# Patient Record
Sex: FEMALE | Race: WHITE | Employment: FULL TIME | URBAN - METROPOLITAN AREA
[De-identification: names, ages, dates, MRNs, and addresses within clinical notes are randomized per-mention and may not be internally consistent; named-entity substitution may affect disease eponyms.]

---

## 2018-01-05 ENCOUNTER — HOSPITAL ENCOUNTER (OUTPATIENT)
Dept: CT IMAGING | Age: 53
Discharge: HOME | End: 2018-01-05
Attending: COLON & RECTAL SURGERY
Payer: COMMERCIAL

## 2018-01-05 DIAGNOSIS — K50.90 CROHN'S DISEASE (HCC): ICD-10-CM

## 2018-01-05 LAB — CREAT BLD-MCNC: 0.9 MG/DL (ref 0.6–1.3)

## 2018-01-05 PROCEDURE — 82565 ASSAY OF CREATININE: CPT

## 2018-01-05 PROCEDURE — 74011636320 HC RX REV CODE- 636/320

## 2018-01-05 PROCEDURE — 74177 CT ABD & PELVIS W/CONTRAST: CPT

## 2018-01-05 RX ADMIN — IOPAMIDOL: 755 INJECTION, SOLUTION INTRAVENOUS at 17:00

## 2018-01-08 ENCOUNTER — ANESTHESIA EVENT (OUTPATIENT)
Dept: SURGERY | Age: 53
End: 2018-01-08
Payer: COMMERCIAL

## 2018-01-08 RX ORDER — LOSARTAN POTASSIUM 100 MG/1
100 TABLET ORAL DAILY
COMMUNITY

## 2018-01-08 RX ORDER — BISMUTH SUBSALICYLATE 262 MG
1 TABLET,CHEWABLE ORAL DAILY
COMMUNITY
End: 2018-02-12

## 2018-01-08 RX ORDER — OMEPRAZOLE 20 MG/1
20 CAPSULE, DELAYED RELEASE ORAL DAILY
COMMUNITY
End: 2018-02-12

## 2018-01-08 RX ORDER — LANOLIN ALCOHOL/MO/W.PET/CERES
CREAM (GRAM) TOPICAL
COMMUNITY
End: 2018-02-12

## 2018-01-08 RX ORDER — CETIRIZINE HCL 10 MG
10 TABLET ORAL DAILY
COMMUNITY
End: 2018-02-12

## 2018-01-08 RX ORDER — LANOLIN ALCOHOL/MO/W.PET/CERES
1 CREAM (GRAM) TOPICAL DAILY
COMMUNITY
End: 2018-02-12

## 2018-01-08 RX ORDER — CYCLOBENZAPRINE HCL 10 MG
10 TABLET ORAL 2 TIMES DAILY
COMMUNITY
End: 2018-02-12

## 2018-01-08 RX ORDER — MONTELUKAST SODIUM 10 MG/1
10 TABLET ORAL DAILY
COMMUNITY

## 2018-01-08 RX ORDER — MULTIVIT WITH MINERALS/HERBS
1 TABLET ORAL DAILY
COMMUNITY
End: 2018-02-12

## 2018-01-08 NOTE — PERIOP NOTES
[facility]  Ambulatory Surgery Unit  Pre-operative Instructions    Surgery/Procedure Date  1/9/18            Tentative Arrival Time 1015      1. On the day of your surgery/procedure, please report to the Ambulatory Surgery Unit Registration Desk and sign in at your designated time. The Ambulatory Surgery Unit is located in [facility] on the [facility] side of the [facility] across from the [facility] building. Please have all of your health insurance cards and a photo ID. 2. You must have someone with you to drive you home, as you should not drive a car for 24 hours following anesthesia. Please make arrangements for a responsible adult friend or family member to stay with you for at least the first 24 hours after your surgery. 3. Do not have anything to eat or drink (including water, gum, mints, coffee, juice) after midnight   1/8/18. This may not apply to medications prescribed by your physician. (Please note below the special instructions with medications to take the morning of surgery, if applicable.)    4. We recommend you do not drink any alcoholic beverages for 24 hours before and after your surgery. 5. Contact your surgeons office for instructions on the following medications: non-steroidal anti-inflammatory drugs (i.e. Advil, Aleve), vitamins, and supplements. (Some surgeons will want you to stop these medications prior to surgery and others may allow you to take them)   **If you are currently taking Plavix, Coumadin, Aspirin and/or other blood-thinning agents, contact your surgeon for instructions. ** Your surgeon will partner with the physician prescribing these medications to determine if it is safe to stop or if you need to continue taking. Please do not stop taking these medications without instructions from your surgeon.     6. In an effort to help prevent surgical site infection, we ask that you shower with an anti-bacterial soap (i.e. Dial or Safeguard) for 3 days prior to and on the morning of surgery, using a fresh towel after each shower. (Please begin this process with fresh bed linens.) Do not apply any lotions, powders, or deodorants after the shower on the day of your procedure. If applicable, please do not shave the operative site for 48 hours prior to surgery. 7. Wear comfortable clothes. Wear glasses instead of contacts. Do not bring any jewelry or money (other than copays or fees as instructed). Do not wear make-up, particularly mascara, the morning of your surgery. Do not wear nail polish, particularly if you are having foot /hand surgery. Wear your hair loose or down, no ponytails, buns, berna pins or clips. All body piercings must be removed. 8. You should understand that if you do not follow these instructions your surgery may be cancelled. If your physical condition changes (i.e. fever, cold or flu) please contact your surgeon as soon as possible. 9. It is important that you be on time. If a situation occurs where you may be late, or if you have any questions or problems, please call [phone].    10. Your surgery time may be subject to change. You will receive a phone call the day prior to surgery to confirm your arrival time. 11. Pediatric patients: please bring a change of clothes, diapers, bottle/sippy cup, pacifier, etc.      Special Instructions: Take all medications and inhalers, as prescribed, on the morning of surgery with a sip of water EXCEPT: none      I understand a pre-operative phone call will be made to verify my surgery time. In the event that I am not available, I give permission for a message to be left on my answering service and/or with another person?       Yes     (instructions given verbally during phone assessment- pt voiced understanding)     ___________________      ___________________      ________________  (Signature of Patient)          (Witness)                   (Date and Time)

## 2018-01-09 ENCOUNTER — ANESTHESIA (OUTPATIENT)
Dept: SURGERY | Age: 53
End: 2018-01-09
Payer: COMMERCIAL

## 2018-01-09 ENCOUNTER — HOSPITAL ENCOUNTER (OUTPATIENT)
Age: 53
Setting detail: OUTPATIENT SURGERY
Discharge: HOME | End: 2018-01-09
Attending: COLON & RECTAL SURGERY | Admitting: COLON & RECTAL SURGERY
Payer: COMMERCIAL

## 2018-01-09 ENCOUNTER — APPOINTMENT (OUTPATIENT)
Dept: CT IMAGING | Age: 53
End: 2018-01-09
Attending: COLON & RECTAL SURGERY
Payer: COMMERCIAL

## 2018-01-09 VITALS
BODY MASS INDEX: 33.51 KG/M2 | HEIGHT: 69 IN | WEIGHT: 226.25 LBS | SYSTOLIC BLOOD PRESSURE: 147 MMHG | TEMPERATURE: 97.9 F | HEART RATE: 78 BPM | RESPIRATION RATE: 16 BRPM | DIASTOLIC BLOOD PRESSURE: 81 MMHG | OXYGEN SATURATION: 95 %

## 2018-01-09 PROBLEM — K62.4 ANAL STRICTURE: Status: ACTIVE | Noted: 2018-01-09

## 2018-01-09 PROCEDURE — 74011000250 HC RX REV CODE- 250

## 2018-01-09 PROCEDURE — 76210000040 HC AMBSU PH I REC FIRST 0.5 HR: Performed by: COLON & RECTAL SURGERY

## 2018-01-09 PROCEDURE — 88305 TISSUE EXAM BY PATHOLOGIST: CPT | Performed by: COLON & RECTAL SURGERY

## 2018-01-09 PROCEDURE — 74011000250 HC RX REV CODE- 250: Performed by: COLON & RECTAL SURGERY

## 2018-01-09 PROCEDURE — 72192 CT PELVIS W/O DYE: CPT

## 2018-01-09 PROCEDURE — 77030008684 HC TU ET CUF COVD -B: Performed by: NURSE ANESTHETIST, CERTIFIED REGISTERED

## 2018-01-09 PROCEDURE — 74011250636 HC RX REV CODE- 250/636: Performed by: ANESTHESIOLOGY

## 2018-01-09 PROCEDURE — 74011250636 HC RX REV CODE- 250/636

## 2018-01-09 PROCEDURE — 77030018836 HC SOL IRR NACL ICUM -A: Performed by: COLON & RECTAL SURGERY

## 2018-01-09 PROCEDURE — 77030021352 HC CBL LD SYS DISP COVD -B: Performed by: COLON & RECTAL SURGERY

## 2018-01-09 PROCEDURE — 77030026438 HC STYL ET INTUB CARD -A: Performed by: NURSE ANESTHETIST, CERTIFIED REGISTERED

## 2018-01-09 PROCEDURE — 77030020255 HC SOL INJ LR 1000ML BG: Performed by: COLON & RECTAL SURGERY

## 2018-01-09 PROCEDURE — 76210000050 HC AMBSU PH II REC 0.5 TO 1 HR: Performed by: COLON & RECTAL SURGERY

## 2018-01-09 PROCEDURE — 77030019988 HC FCPS ENDOSC DISP BSC -B: Performed by: COLON & RECTAL SURGERY

## 2018-01-09 PROCEDURE — 76060000062 HC AMB SURG ANES 1 TO 1.5 HR: Performed by: COLON & RECTAL SURGERY

## 2018-01-09 PROCEDURE — 76030000001 HC AMB SURG OR TIME 1 TO 1.5: Performed by: COLON & RECTAL SURGERY

## 2018-01-09 RX ORDER — SODIUM CHLORIDE, SODIUM LACTATE, POTASSIUM CHLORIDE, CALCIUM CHLORIDE 600; 310; 30; 20 MG/100ML; MG/100ML; MG/100ML; MG/100ML
25 INJECTION, SOLUTION INTRAVENOUS CONTINUOUS
Status: DISCONTINUED | OUTPATIENT
Start: 2018-01-09 | End: 2018-01-09 | Stop reason: HOSPADM

## 2018-01-09 RX ORDER — LIDOCAINE HYDROCHLORIDE 10 MG/ML
0.1 INJECTION, SOLUTION EPIDURAL; INFILTRATION; INTRACAUDAL; PERINEURAL AS NEEDED
Status: DISCONTINUED | OUTPATIENT
Start: 2018-01-09 | End: 2018-01-09 | Stop reason: HOSPADM

## 2018-01-09 RX ORDER — OXYCODONE AND ACETAMINOPHEN 5; 325 MG/1; MG/1
1 TABLET ORAL ONCE
Status: DISCONTINUED | OUTPATIENT
Start: 2018-01-09 | End: 2018-01-09 | Stop reason: HOSPADM

## 2018-01-09 RX ORDER — ROCURONIUM BROMIDE 10 MG/ML
INJECTION, SOLUTION INTRAVENOUS AS NEEDED
Status: DISCONTINUED | OUTPATIENT
Start: 2018-01-09 | End: 2018-01-09 | Stop reason: HOSPADM

## 2018-01-09 RX ORDER — HYDROMORPHONE HYDROCHLORIDE 1 MG/ML
.2-.5 INJECTION, SOLUTION INTRAMUSCULAR; INTRAVENOUS; SUBCUTANEOUS ONCE
Status: DISCONTINUED | OUTPATIENT
Start: 2018-01-09 | End: 2018-01-09 | Stop reason: HOSPADM

## 2018-01-09 RX ORDER — NEOSTIGMINE METHYLSULFATE 1 MG/ML
INJECTION INTRAVENOUS AS NEEDED
Status: DISCONTINUED | OUTPATIENT
Start: 2018-01-09 | End: 2018-01-09 | Stop reason: HOSPADM

## 2018-01-09 RX ORDER — HYDROCODONE BITARTRATE AND ACETAMINOPHEN 5; 325 MG/1; MG/1
1 TABLET ORAL
COMMUNITY
End: 2018-02-12

## 2018-01-09 RX ORDER — BUPIVACAINE HYDROCHLORIDE 2.5 MG/ML
INJECTION, SOLUTION EPIDURAL; INFILTRATION; INTRACAUDAL AS NEEDED
Status: DISCONTINUED | OUTPATIENT
Start: 2018-01-09 | End: 2018-01-09 | Stop reason: HOSPADM

## 2018-01-09 RX ORDER — ONDANSETRON 2 MG/ML
INJECTION INTRAMUSCULAR; INTRAVENOUS AS NEEDED
Status: DISCONTINUED | OUTPATIENT
Start: 2018-01-09 | End: 2018-01-09 | Stop reason: HOSPADM

## 2018-01-09 RX ORDER — FENTANYL CITRATE 50 UG/ML
INJECTION, SOLUTION INTRAMUSCULAR; INTRAVENOUS AS NEEDED
Status: DISCONTINUED | OUTPATIENT
Start: 2018-01-09 | End: 2018-01-09 | Stop reason: HOSPADM

## 2018-01-09 RX ORDER — SODIUM CHLORIDE 0.9 % (FLUSH) 0.9 %
5-10 SYRINGE (ML) INJECTION EVERY 8 HOURS
Status: DISCONTINUED | OUTPATIENT
Start: 2018-01-09 | End: 2018-01-09 | Stop reason: HOSPADM

## 2018-01-09 RX ORDER — SUCCINYLCHOLINE CHLORIDE 20 MG/ML
INJECTION INTRAMUSCULAR; INTRAVENOUS AS NEEDED
Status: DISCONTINUED | OUTPATIENT
Start: 2018-01-09 | End: 2018-01-09 | Stop reason: HOSPADM

## 2018-01-09 RX ORDER — DEXAMETHASONE SODIUM PHOSPHATE 4 MG/ML
INJECTION, SOLUTION INTRA-ARTICULAR; INTRALESIONAL; INTRAMUSCULAR; INTRAVENOUS; SOFT TISSUE AS NEEDED
Status: DISCONTINUED | OUTPATIENT
Start: 2018-01-09 | End: 2018-01-09 | Stop reason: HOSPADM

## 2018-01-09 RX ORDER — PROPOFOL 10 MG/ML
INJECTION, EMULSION INTRAVENOUS AS NEEDED
Status: DISCONTINUED | OUTPATIENT
Start: 2018-01-09 | End: 2018-01-09 | Stop reason: HOSPADM

## 2018-01-09 RX ORDER — SODIUM CHLORIDE 0.9 % (FLUSH) 0.9 %
5-10 SYRINGE (ML) INJECTION AS NEEDED
Status: DISCONTINUED | OUTPATIENT
Start: 2018-01-09 | End: 2018-01-09 | Stop reason: HOSPADM

## 2018-01-09 RX ORDER — GLYCOPYRROLATE 0.2 MG/ML
INJECTION INTRAMUSCULAR; INTRAVENOUS AS NEEDED
Status: DISCONTINUED | OUTPATIENT
Start: 2018-01-09 | End: 2018-01-09 | Stop reason: HOSPADM

## 2018-01-09 RX ORDER — LIDOCAINE HYDROCHLORIDE 20 MG/ML
INJECTION, SOLUTION EPIDURAL; INFILTRATION; INTRACAUDAL; PERINEURAL AS NEEDED
Status: DISCONTINUED | OUTPATIENT
Start: 2018-01-09 | End: 2018-01-09 | Stop reason: HOSPADM

## 2018-01-09 RX ORDER — DIPHENHYDRAMINE HYDROCHLORIDE 50 MG/ML
12.5 INJECTION, SOLUTION INTRAMUSCULAR; INTRAVENOUS AS NEEDED
Status: DISCONTINUED | OUTPATIENT
Start: 2018-01-09 | End: 2018-01-09 | Stop reason: HOSPADM

## 2018-01-09 RX ORDER — MIDAZOLAM HYDROCHLORIDE 1 MG/ML
INJECTION, SOLUTION INTRAMUSCULAR; INTRAVENOUS AS NEEDED
Status: DISCONTINUED | OUTPATIENT
Start: 2018-01-09 | End: 2018-01-09 | Stop reason: HOSPADM

## 2018-01-09 RX ORDER — FENTANYL CITRATE 50 UG/ML
25 INJECTION, SOLUTION INTRAMUSCULAR; INTRAVENOUS
Status: DISCONTINUED | OUTPATIENT
Start: 2018-01-09 | End: 2018-01-09 | Stop reason: HOSPADM

## 2018-01-09 RX ORDER — MORPHINE SULFATE 10 MG/ML
2 INJECTION, SOLUTION INTRAMUSCULAR; INTRAVENOUS
Status: DISCONTINUED | OUTPATIENT
Start: 2018-01-09 | End: 2018-01-09 | Stop reason: HOSPADM

## 2018-01-09 RX ADMIN — DEXAMETHASONE SODIUM PHOSPHATE 4 MG: 4 INJECTION, SOLUTION INTRA-ARTICULAR; INTRALESIONAL; INTRAMUSCULAR; INTRAVENOUS; SOFT TISSUE at 12:06

## 2018-01-09 RX ADMIN — LIDOCAINE HYDROCHLORIDE 80 MG: 20 INJECTION, SOLUTION EPIDURAL; INFILTRATION; INTRACAUDAL; PERINEURAL at 11:52

## 2018-01-09 RX ADMIN — PROPOFOL 60 MG: 10 INJECTION, EMULSION INTRAVENOUS at 12:04

## 2018-01-09 RX ADMIN — ROCURONIUM BROMIDE 15 MG: 10 INJECTION, SOLUTION INTRAVENOUS at 12:06

## 2018-01-09 RX ADMIN — FENTANYL CITRATE 50 MCG: 50 INJECTION, SOLUTION INTRAMUSCULAR; INTRAVENOUS at 11:52

## 2018-01-09 RX ADMIN — FENTANYL CITRATE 50 MCG: 50 INJECTION, SOLUTION INTRAMUSCULAR; INTRAVENOUS at 11:44

## 2018-01-09 RX ADMIN — MIDAZOLAM HYDROCHLORIDE 2 MG: 1 INJECTION, SOLUTION INTRAMUSCULAR; INTRAVENOUS at 11:44

## 2018-01-09 RX ADMIN — PROPOFOL 40 MG: 10 INJECTION, EMULSION INTRAVENOUS at 12:30

## 2018-01-09 RX ADMIN — ONDANSETRON 4 MG: 2 INJECTION INTRAMUSCULAR; INTRAVENOUS at 12:07

## 2018-01-09 RX ADMIN — NEOSTIGMINE METHYLSULFATE 3 MG: 1 INJECTION INTRAVENOUS at 12:36

## 2018-01-09 RX ADMIN — PROPOFOL 30 MG: 10 INJECTION, EMULSION INTRAVENOUS at 12:34

## 2018-01-09 RX ADMIN — GLYCOPYRROLATE 0.2 MG: 0.2 INJECTION INTRAMUSCULAR; INTRAVENOUS at 12:36

## 2018-01-09 RX ADMIN — SODIUM CHLORIDE, SODIUM LACTATE, POTASSIUM CHLORIDE, AND CALCIUM CHLORIDE 25 ML/HR: 600; 310; 30; 20 INJECTION, SOLUTION INTRAVENOUS at 11:17

## 2018-01-09 RX ADMIN — SUCCINYLCHOLINE CHLORIDE 180 MG: 20 INJECTION INTRAMUSCULAR; INTRAVENOUS at 11:52

## 2018-01-09 RX ADMIN — PROPOFOL 30 MG: 10 INJECTION, EMULSION INTRAVENOUS at 12:37

## 2018-01-09 RX ADMIN — PROPOFOL 170 MG: 10 INJECTION, EMULSION INTRAVENOUS at 11:52

## 2018-01-09 NOTE — INTERVAL H&P NOTE
H&P Update:  Shyla Arroyo was seen and examined. History and physical has been reviewed. The patient has been examined.  There have been no significant clinical changes since the completion of the originally dated History and Physical.    Signed By: David Ascencio MD     January 9, 2018 11:42 AM

## 2018-01-09 NOTE — IP AVS SNAPSHOT
[address] [facility] 83. 
[phone] Patient: Aditya Kearns MRN: [id number] :1965 About your hospitalization You were admitted on:  2018 You last received care in the:  [facility] SURGERY UNIT You were discharged on:  2018 Why you were hospitalized Your primary diagnosis was: Anal Stricture Follow-up Information Follow up With Details Comments Contact Info Wanda Michelle MD   [address] [place] [address] Drive [address] [place] [address] [phone] Discharge Orders None A check shraddha indicates which time of day the medication should be taken. My Medications CONTINUE taking these medications Instructions Each Dose to Equal  
 Morning Noon Evening Bedtime  
 b complex vitamins tablet Your last dose was: Your next dose is: Take 1 Tab by mouth daily. 1 Tab  
    
   
   
   
  
 cyclobenzaprine 10 mg tablet Commonly known as:  FLEXERIL Your last dose was: Your next dose is: Take 10 mg by mouth two (2) times a day. 10 mg  
    
   
   
   
  
 glucosamine-chondroitin 500-400 mg Cap Commonly known as:  [address] [place] [address] Your last dose was: Your next dose is: Take 1 Cap by mouth daily. 1 Cap HYDROcodone-acetaminophen 5-325 mg per tablet Commonly known as:  Doximity Your last dose was: Your next dose is: Take 1 Tab by mouth every six (6) hours as needed for Pain. 1 Tab Iron 325 mg (65 mg iron) tablet Generic drug:  ferrous sulfate Your last dose was: Your next dose is: Take  by mouth Daily (before breakfast). losartan 100 mg tablet Commonly known as:  COZAAR Your last dose was: Your next dose is: Take 100 mg by mouth daily.   
 100 mg  
 montelukast 10 mg tablet Commonly known as:  SINGULAIR Your last dose was: Your next dose is: Take 10 mg by mouth daily. 10 mg  
    
   
   
   
  
 multivitamin tablet Commonly known as:  ONE A DAY Your last dose was: Your next dose is: Take 1 Tab by mouth daily. 1 Tab PriLOSEC 20 mg capsule Generic drug:  omeprazole Your last dose was: Your next dose is: Take 20 mg by mouth daily. 20 mg PROBIOTIC PO Your last dose was: Your next dose is: Take  by mouth daily. ZyrTEC 10 mg tablet Generic drug:  cetirizine Your last dose was: Your next dose is: Take 10 mg by mouth daily. 10 mg Discharge Instructions Martín Rivera MD, FACS Dorian BATRES. Charlee Gil MD, FACS Dom BATRES. Derick Saint, MD, FACS Jluis Amaro. Joby Cain MD, FACS Darleen Sheht MD, FACS Drew Robbins. MD Gil Pacheco MD 
 
[facility] Discharge Instructions for Ambulatory 23-Hour Surgery Patients 1. Advance to regular diet as tolerated. 2. Remove dressing at anytime. 3. Take 2 to 4 sitz baths today (warm water baths for 15-20 minutes). Begin four (4) times a day the day after surgery. 4. Apply Nupercainal or recticare Ointment (does not need a prescription) to the anal area after sitz baths, place a dry 4x4 gauze over the are between the buttocks. 5. Take pain medication as prescribed. (NO DRIVING WHILE ON PAIN MEDICATION). 6. No lifting any objects weighing more than 30 pounds. 7. No sitting more than 45 minutes without standing, walking, or lying down. 8. You may walk as desired. 9.  Please schedule an appointment in the office within 7 to 10 days. Call ahead to schedule your appointment [phone]. 10.  Call Exchange [phone] if you have any problems or questions after   hours. 11.  Expect slight bright red blood up to four (4) weeks from surgery. 12.  Will recheck a pelvic CT today. DO NOT TAKE TYLENOL/ACETAMINOPHEN WITH PERCOCET, LORTAB, [address] TAKE NARCOTIC PAIN MEDICATIONS WITH FOOD If given 2 pain narcotics do NOT take together! Narcotics tend to be constipating, we suggest taking a stool softener such as Colace or Miralax (follow package instructions). DO NOT DRIVE WHILE TAKING NARCOTIC PAIN MEDICATIONS. DO NOT TAKE SLEEPING MEDICATIONS OR ANTIANXIETY MEDICATIONS WHILE TAKING NARCOTIC PAIN MEDICATIONS,  ESPECIALLY THE NIGHT OF ANESTHESIA. CPAP PATIENTS BE SURE TO WEAR MACHINE WHENEVER NAPPING OR SLEEPING. DISCHARGE SUMMARY from Nurse The following personal items collected during your admission are returned to you:  
Dental Appliance: Dental Appliances: Lowers, Uppers Vision: Visual Aid: Glasses (glasses to recovery along with teeth) Hearing Aid:   
Jewelry:   
Clothing: Clothing:  (clothing under stretcher, phone to brother, glasses and dentures to recovery) Other Valuables:   
Valuables sent to safe:   
 
 
PATIENT INSTRUCTIONS: 
 
 
B - Balance E - Eyes F-  Face looks uneven A-  Arms unable to move or move even S-  Speech slurred or non-existent T-  Time-call 911 as soon as signs and symptoms begin-DO NOT go Back to bed or wait to see if you get better-TIME IS BRAIN. If you have not received your influenza and/or pneumococcal vaccine, please follow up with your primary care physician. The discharge information has been reviewed with the patient and caregiver. The patient and caregiver verbalized understanding. Introducing [facility]! Desiree Grady introduces [employer] patient portal. Now you can access parts of your medical record, email your doctor's office, and request medication refills online. 1. In your internet browser, go to [email or web address] 2. Click on the First Time User? Click Here link in the Sign In box. You will see the New Member Sign Up page. 3. Enter your [employer] Access Code exactly as it appears below. You will not need to use this code after youve completed the sign-up process. If you do not sign up before the expiration date, you must request a new code. · [employer] Access Code: [email or web address] Expires: 4/5/2018  1:49 PM 
 
 4. Enter the last four digits of your Social Security Number (xxxx) and Date of Birth (mm/dd/yyyy) as indicated and click Submit. You will be taken to the next sign-up page. 5. Create a [employer] ID. This will be your [employer] login ID and cannot be changed, so think of one that is secure and easy to remember. 6. Create a [employer] password. You can change your password at any time. 7. Enter your Password Reset Question and Answer. This can be used at a later time if you forget your password. 8. Enter your e-mail address. You will receive e-mail notification when new information is available in [address] 9. Click Sign Up. You can now view and download portions of your medical record. 10. Click the Download Summary menu link to download a portable copy of your medical information. If you have questions, please visit the Frequently Asked Questions section of the [employer] website. Remember, [employer] is NOT to be used for urgent needs. For medical emergencies, dial 911. Now available from your iPhone and Android! Providers Seen During Your Hospitalization Provider Specialty Primary office phone Anant Daniel MD [facility] [phone] Your Primary Care Physician (PCP) Primary Care Physician Office Phone Office Fax [facility] [address] [phone] You are allergic to the following No active allergies Recent Documentation Height Weight BMI OB Status Smoking Status 1.753 m 102.6 kg 33.41 kg/m2 Hysterectomy Current Every Day Smoker Emergency Contacts Name Discharge Info Relation Home Work Mobile [facility] DISCHARGE CAREGIVER [3] Other Relative [6] [phone] Patient Belongings  The following personal items are in your possession at time of discharge: 
  Dental Appliances: Lowers, Uppers  Visual Aid: Glasses (glasses to recovery along with teeth)             Clothing:  (clothing under stretcher, phone to brother, glasses and dentures to recovery) Please provide this summary of care documentation to your next provider. Signatures-by signing, you are acknowledging that this After Visit Summary has been reviewed with you and you have received a copy. Patient Signature:  ____________________________________________________________ Date:  ____________________________________________________________  
  
Suzanne Mealing Provider Signature:  ____________________________________________________________ Date:  ____________________________________________________________

## 2018-01-09 NOTE — OP NOTES
[facility] OP NOTE    Sharad French  MR#: [id number]  : 1965  ACCOUNT #: [de-identified]   DATE OF SERVICE: 2018    SURGEON:  Carlos Alberto [place]. Sharla Ambriz MD.     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 PROCEDURE:  Exam under anesthesia, dilation of anal canal from a #4 to a #20 Hegar dilator with decompression of the cystic cavity above the anal outlet, likely rectum, with flexible endoscopy and biopsies of likely a Nikki stump now decompressed above a now patent anal outlet. ANESTHESIA:  General.    PREOPERATIVE MEDICATIONS:  None. ESTIMATED BLOOD LOSS:  Less than 5 mL. SPECIMENS REMOVED:  Rectal biopsies    COMPLICATIONS:  none    INDICATIONS:  The patient is a 52-year-old woman who has had a total abdominal colectomy for Crohn's. She was told that she had a rectal stump left in place, but subsequently was told that only her anal canal was in place. She recently presented to the office and was found to have pelvic pain. In view of her history of possibly having a rectum left in place. We went ahead and got a CAT scan which shows what does appear to be a dilated fluid filled Nikki stump above an effectively completely obstructed Crohn's involved anal canal.  She is brought to the operating room today for exam under anesthesia and dilatation of the anal canal to allow decompression of his massively distended rectum.   It is possible that this may be a mucocele above the anal canal, but in any event, the fluid is drainable through the anal canal and if it is indeed her rectum, it is effectively completely obstructed by a stenosed closed Crohn's involved anal canal.  She is aware of the risks of the surgery including anesthesia, infection, bleeding, and is agreeable to proceed. PROCEDURE IN DETAIL:  After uneventful induction of general anesthetic, the patient was placed in a supine lithotomy position and sterilely prepped and draped. A digital exam revealed that indeed the anal canal was closed at the superior aspect of it. Using a #4 dilator, I carefully maneuvered through the top of the anal canal and entered a cystic cavity with return of copious amount of clear liquid fluid. The opening was dilated gradually from a #4 all the way up to a #20 Hegar dilator and digital exam revealed that the cavity followed the exact location of the rectum should in fact be. Indeed also with the finger in her vagina posteriorly before dilatation, we could feel the posterior wall being indented by the structure and when I was done dilating and decompressing all this fluid, that indentation was gone and the vagina had filled back out to its normal location and I could, with one finger inside the cystic cavity, feel thick tissue comprising what I suspect is in fact the rectum anterior to my finger and the posterior vaginal wall all completely intact. With this all done and this decompressed. I then inserted an upper endoscope and I used a smaller size scope for safety. We advanced the scope to the end of what appears to be Nikki stump. The mucosa is atypical and looks to be compatible with burned out Crohn's disease and an old Nikki stump. Photographs were obtained and then multiple biopsies were obtained. The cavity was completely decompressed and the scope was removed. She tolerated the procedure well, remained stable and left with a stable set of vitals. We will recheck a CAT scan postoperatively to be sure that this cavity has been nicely decompressed and we will await with interest the results of the biopsies.       MD BETSEY Brewer / Arnoldo.Darya  D: 01/09/2018 13:11     T: 01/09/2018 13:37  JOB #: [id number]  CC: Elizabeth Fermin

## 2018-01-09 NOTE — PERIOP NOTES
Ilan Sewell [facility]  1965  [id number]    Situation:  Verbal report given from: KAY Carr RN and KYLEE Jacob CRNA  Procedure: Procedure(s):   EXAM UNDER ANESTHESIA, DILATION OF HIGH GRADE COMPLETE ANAL CROHN'S  STRICTURE AND FLEXIBLE SIGMOIDOSCOPY WITH BIOPSIES OF RECTUM    Background:    Preoperative diagnosis: ANAL CANAL STRICTURE    Postoperative diagnosis: High grade complete anal stricture with obstruction of Nikki's rectal stump    :  Dr. Alcantar Ornava    Assistant(s): Circ-1: Jeffry Cabello RN  Scrub Tech-1: Jani Armor  Surg Asst-1: Leanora Holiday    Specimens:   ID Type Source Tests Collected by Time Destination   1 : Possible Nikki's stump (rectal stump) biopsy Preservative Rectal  Donna Cramer MD 1/9/2018 1225 Pathology       Assessment:  Intra-procedure medications         Anesthesia gave intra-procedure sedation and medications, see anesthesia flow sheet     Intravenous fluids: LR@ KVO     Vital signs stable       Recommendation:    Permission to share finding with family or friend yes

## 2018-01-09 NOTE — BRIEF OP NOTE
BRIEF OPERATIVE NOTE    Date of Procedure: 1/9/2018   Preoperative Diagnosis: ANAL CANAL STRICTURE  Postoperative Diagnosis: High grade complete anal stricture with obstruction of Nikki's rectal stump    Procedure(s):   EXAM UNDER ANESTHESIA, DILATION OF HIGH GRADE COMPLETE ANAL CROHN'S  STRICTURE AND FLEXIBLE SIGMOIDOSCOPY WITH BIOPSIES OF RECTUM  Surgeon(s) and Role:     * Mary Ramirez MD - Primary         Assistant Staff:       Surgical Staff:  Circ-1: Bashir Skinner RN  Scrub Tech-1: Monsalve Jurist  Surg Asst-1: Faustino Cao  Event Time In   Incision Start 1205   Incision Close 1232     Anesthesia: General   Estimated Blood Loss: 5 cc's  Specimens:   ID Type Source Tests Collected by Time Destination   1 : Possible Nikki's stump (rectal stump) biopsy Preservative Rectal  Mary Ramirez MD 1/9/2018 1225 Pathology      Findings: dilated cystic cavity above the anus;  Scoped and biopsied after dilation to #20 hegar, starting with a # 4.    Complications: none  Implants: * No implants in log *

## 2018-01-09 NOTE — PERIOP NOTES
1335 To radiology for CT scan of pelvis 1350 Ct scan pelvis completed. 1400 Transported back to [facility] via stretcher per 2 RNs. Pt awake/alert VSS. No complaints. D/C instructions reviewed with brother Alexey Josue and with boyfriend Josie Delgado (by phone -at pt's request)  1562 Discharged to home via/wc,accompanied to car per RN. Skin warm and dry, awake and alert. Respirations even, unlabored. Pt and family members questions and concerns addressed prior to discharge. All belongings with pt.

## 2018-01-09 NOTE — DISCHARGE INSTRUCTIONS
Akil Jewell MD, FACS  Dorian BATRES. Brenden Montes De Oca MD, FACS  Nicole Perdue. Jarad Dailey MD, [address] [facility] Damaris Shah MD, [address] [facility] Glen Maxwell MD, FACS  Kenneth Osborn. MD Cornelius Lawrence MD    [facility] Discharge Instructions for Ambulatory 23-Hour Surgery Patients    1. Advance to regular diet as tolerated. 2. Remove dressing at anytime. 3. Take 2 to 4 sitz baths today (warm water baths for 15-20 minutes). Begin four (4) times a day the day after surgery. 4. Apply Nupercainal or recticare Ointment (does not need a prescription) to the anal area after sitz baths, place a dry 4x4 gauze over the are between the buttocks. 5. Take pain medication as prescribed. (NO DRIVING WHILE ON PAIN MEDICATION). 6. No lifting any objects weighing more than 30 pounds. 7. No sitting more than 45 minutes without standing, walking, or lying down. 8. You may walk as desired. 9.  Please schedule an appointment in the office within 7 to 10 days. Call ahead to schedule your appointment [phone]. 10.  Call Exchange [phone] if you have any problems or questions after   hours. 11.  Expect slight bright red blood up to four (4) weeks from surgery. 12.  Will recheck a pelvic CT today. DO NOT TAKE TYLENOL/ACETAMINOPHEN WITH PERCOCET, [facility], [address] TAKE NARCOTIC PAIN MEDICATIONS WITH FOOD     If given 2 pain narcotics do NOT take together! Narcotics tend to be constipating, we suggest taking a stool softener such as Colace or Miralax (follow package instructions). DO NOT DRIVE WHILE TAKING NARCOTIC PAIN MEDICATIONS. DO NOT TAKE SLEEPING MEDICATIONS OR ANTIANXIETY MEDICATIONS WHILE TAKING NARCOTIC PAIN MEDICATIONS,  ESPECIALLY THE NIGHT OF ANESTHESIA. CPAP PATIENTS BE SURE TO WEAR MACHINE WHENEVER NAPPING OR SLEEPING.     DISCHARGE SUMMARY from Nurse    The following personal items collected during your admission are returned to you:   Dental Appliance: Dental Appliances: Lowers, Uppers  Vision: Visual Aid: Glasses (glasses to recovery along with teeth)  Hearing Aid:    Jewelry:    Clothing: Clothing:  (clothing under stretcher, phone to brother, glasses and dentures to recovery)  Other Valuables:    Valuables sent to safe:        PATIENT INSTRUCTIONS:    After General Anesthesia or Intravenous Sedation, for 24 hours or while taking prescription Narcotics:        Someone should be with you for the next 24 hours. For your own safety, a responsible adult must drive you home. · Limit your activities  · Recommended activity: Rest today, up with assistance today. Do not climb stairs or shower unattended for the next 24 hours. · Please start with a soft bland diet and advance as tolerated (no nausea) to regular diet. · If you have a sore throat you should try the following: fluids, warm salt water gargles, or throat lozenges. If it does not improve after several days please follow up with your primary physician. · Do not drive and operate hazardous machinery  · Do not make important personal or business decisions  · Do  not drink alcoholic beverages  · If you have not urinated within 8 hours after discharge, please contact your surgeon on call. Report the following to your surgeon:  · Excessive pain, swelling, redness or odor of or around the surgical area  · Temperature over 100.5  · Nausea and vomiting lasting longer than 4 hours or if unable to take medications  · Any signs of decreased circulation or nerve impairment to extremity: change in color, persistent  numbness, tingling, coldness or increase pain      · You will receive a Post Operative Call from one of the Recovery Room Nurses on the day after your surgery to check on you. It is very important for us to know how you are recovering after your surgery.  If you have an issue or need to speak with someone, please call your surgeon, do not wait for the post operative call. · You may receive an e-mail or letter in the mail from [place] regarding your experience with us in the Ambulatory Surgery Unit. Your feedback is valuable to us and we appreciate your participation in the survey. · If the above instructions are not adequate, please contact Bhumi Kovacs RN, Liliane anesthesia Nurse Manager or our Anesthesiologist, at [phone]. If you are having problems after your surgery, call the physician at his office number. · We wish you a speedy recovery ? What to do at Home:      *  Please give a list of your current medications to your Primary Care Provider. *  Please update this list whenever your medications are discontinued, doses are      changed, or new medications (including over-the-counter products) are added. *  Please carry medication information at all times in case of emergency situations. These are general instructions for a healthy lifestyle:    No smoking/ No tobacco products/ Avoid exposure to second hand smoke    Surgeon General's Warning:  Quitting smoking now greatly reduces serious risk to your health. Obesity, smoking, and sedentary lifestyle greatly increases your risk for illness    A healthy diet, regular physical exercise & weight monitoring are important for maintaining a healthy lifestyle    You may be retaining fluid if you have a history of heart failure or if you experience any of the following symptoms:  Weight gain of 3 pounds or more overnight or 5 pounds in a week, increased swelling in our hands or feet or shortness of breath while lying flat in bed. Please call your doctor as soon as you notice any of these symptoms; do not wait until your next office visit.     Recognize signs and symptoms of STROKE:    B - Balance  E - Eyes    F-  Face looks uneven    A-  Arms unable to move or move even    S-  Speech slurred or non-existent    T-  Time-call 911 as soon as signs and symptoms begin-DO NOT go Back to bed or wait to see if you get better-TIME IS BRAIN. If you have not received your influenza and/or pneumococcal vaccine, please follow up with your primary care physician. The discharge information has been reviewed with the patient and caregiver. The patient and caregiver verbalized understanding.

## 2018-01-09 NOTE — ANESTHESIA PREPROCEDURE EVALUATION
Anesthetic History   No history of anesthetic complications            Review of Systems / Medical History  Patient summary reviewed, nursing notes reviewed and pertinent labs reviewed    Pulmonary          Smoker (1-2 cigs/day)  Asthma        Neuro/Psych   Within defined limits           Cardiovascular    Hypertension              Exercise tolerance: >4 METS     GI/Hepatic/Renal     GERD          Comments: Crohn's dz; no recent steroids  Has Ileostomy Endo/Other        Obesity     Other Findings              Physical Exam    Airway  Mallampati: II  TM Distance: < 4 cm  Neck ROM: normal range of motion       Comments: Small mouth Cardiovascular    Rhythm: regular  Rate: normal      Pertinent negatives: No murmur   Dental    Dentition: Full upper dentures and Full lower dentures     Pulmonary  Breath sounds clear to auscultation               Abdominal  GI exam deferred       Other Findings            Anesthetic Plan    ASA: 3  Anesthesia type: general          Induction: Intravenous  Anesthetic plan and risks discussed with: Patient

## 2018-01-09 NOTE — H&P
[facility]  ACUTE CARE HISTORY AND PHYSICAL    Zoraida Nolan  MR#: [id number]  : 1965  ACCOUNT #: [de-identified]   DATE OF SERVICE: 2018    CHIEF COMPLAINT:  Anal outlet stricture with history of Crohn's and distended rectal Nikki stump above anal stricture, rule out partial obstruction with subsequent pelvic and levator floor pain. HISTORY:  The patient is a 80-year-old white female with a previous history of Crohn's disease culminating in an ileostomy and a total abdominal colectomy with a rectal Nikki's pouch. She had by previous reports anything from a standard Nikki's pouch with rectum and anal canal left, but possibly only anal outlet just above levator floor. She has had an anal outlet stenosis for some time and this has been secondary to her Crohn's disease. She recently presented this past week with pelvic pain and rectal spasm. Because we did not know how much rectum was truly left we went ahead and got a CAT scan on her, which showed that she does indeed have a standard rectal Nikki stump left, but this Nikki stump is significantly distended with mucus and the walls are featureless, it appears most compatible with burned out rectal Crohn's, although a mucocele off an ultra short Nikki stump could academically present in this fashion; however, all things considered and looking at her history and talking with her, I suspect it is a Nikki stump that is partially obstructed from the mucus that it would normally accumulate over the years by a stenotic anal outlet. All this can be settled by an exam under anesthesia, dilating up her anal canal and communicating into this mucus filled sac which I suspect is her residual rectum. This will relieve the partial obstruction, relieve the distension and hopefully relieve her pelvic floor pain.   Subsequently, she is in today for exam under anesthesia and dilatation of the anal outlet up into this seemingly Nikki's stump with burned out featureless rectal wall, which I suspect represents burned out Crohn's. We can then scope her if need be with flexible endoscopy and get some biopsies of the residual rectal wall as well. Subsequently, she is in today for this procedure. PAST MEDICAL HISTORY:  Significant for Crohn's with total abdominal colectomy, Nikki's and an end ileostomy. She has had a tonsillectomy and adenoidectomy, arthroscopic knee surgery on the left, a partial hysterectomy. She has had MRSA, debridement in the perianal area in the past.  She has a history of a cholecystectomy and previous history of pancreatitis. CURRENT MEDICATIONS:    Have included:    1. Losartan. 2.  Montelukast.  3.  Omeprazole. 4.  Zyrtec. 5.  Iron. 6.  Vitamin D.  7.  Glucosamine. 8.  Methocarbamol. 9.  Hyoscyamine. 10.  Nitroglycerin ointment. 11.  Flexeril. Significant also for hypertension, gastroesophageal reflux, and pancreatitis as noted above. She has rare alcohol intake. Smokes half pack of cigarettes a day. FAMILY HISTORY:  Significant for lung cancer and heart disease. REVIEW OF SYSTEMS:    CONSTITUTIONAL:  Significant for some weight gain of about 6 pounds, occasional headaches, some nausea and bloating as well as some pelvic pain. She has a history of some ankle swelling. She has some dysuria along with this current pelvic floor pain. RESPIRATORY:  Significant for some shortness of breath and wheezing. MUSCULOSKELETAL:  Significant for joint pain and chronically sore muscles. She has some chronic fatigue. PHYSICAL EXAMINATION:  GENERAL:  Reveals a pleasant but, obese 52-year-old white female. VITAL SIGNS:  Height 5 feet 9 inches, 235 pounds. HEENT:  Clear. NECK:  Supple. LUNGS:  Clear currently. HEART:  Regular. ABDOMEN:  Obese. There is a right lower quadrant ileostomy. Well-healed surgical incisions. Abdomen is benign. GROINS:  Negative.    RECTAL:  Reveals a stenotic anal outlet with atypical anal canal tags commonly seen with Crohn's. She is too tender for digital exam of the anal outlet, which is quite tight and stenotic and does not admit my fingertip more than just at the anal verge. EXTREMITIES:  No gross deformity. NEUROLOGIC:  Intact. ASSESSMENT:  A 27-year-old Crohn's patient with what I suspect is in point of fact an anal outlet stenosis rather than an ultra short anal outlet Nikki's stump and the stenosis is causing impeded drainage of her true rectum above it, which I think is a standard Nikki's pouch based on the patient's history and on the appearance on CAT scan this past Friday. I suspect that if we can dilate this canal up into the rectum, we can decompress the mucus distended rectum and relieve her pain. RECOMMENDATIONS AND PLAN:  She will undergo exam under anesthesia and dilatation of her anal outlet today. She is aware of the risks including anesthesia, infection and bleeding and she is agreeable to proceed. We will move forward with exam under anesthesia today and dilatation. If we are successful, I would favor a flexible endoscopy of the pouch if possible to try to get some biopsies of the rectal stump as it has been several years since this has been examined and with ongoing Crohn's certainly could present in this fashion and harbor malignant degeneration as well. We will get her in for surgery today and proceed as appropriate based on the operative findings.       MD BETSEY Oliver / [place]  D: 01/09/2018 00:48     T: 01/09/2018 02:11  JOB #: [id number]  CC: CHALO Acosta   CC: Emilee Penaloza MD

## 2018-02-12 NOTE — PERIOP NOTES
[facility]  Ambulatory Surgery Unit  Pre-operative Instructions    Surgery/Procedure Date  02/14/2018            Tentative Arrival Time 0930      1. On the day of your surgery/procedure, please report to the Ambulatory Surgery Unit Registration Desk and sign in at your designated time. The Ambulatory Surgery Unit is located in [facility] on the [facility] side of the \[facility] [place] [facility]\"" across from the [address] [place] [address]. Please have all of your health insurance cards and a photo ID. 2. You must have someone with you to drive you home, as you should not drive a car for 24 hours following anesthesia. Please make arrangements for a responsible adult friend or family member to stay with you for at least the first 24 hours after your surgery. 3. Do not have anything to eat or drink (including water, gum, mints, coffee, juice) after midnight   02/13/2018. This may not apply to medications prescribed by your physician. (Please note below the special instructions with medications to take the morning of surgery, if applicable.)    4. We recommend you do not drink any alcoholic beverages for 24 hours before and after your surgery. 5. Contact your surgeons office for instructions on the following medications: non-steroidal anti-inflammatory drugs (i.e. Advil, Aleve), vitamins, and supplements. (Some surgeons will want you to stop these medications prior to surgery and others may allow you to take them)   **If you are currently taking Plavix, Coumadin, Aspirin and/or other blood-thinning agents, contact your surgeon for instructions. ** Your surgeon will partner with the physician prescribing these medications to determine if it is safe to stop or if you need to continue taking. Please do not stop taking these medications without instructions from your surgeon.     6. In an effort to help prevent surgical site infection, we ask that you shower with an anti-bacterial soap (i.e. Dial or Safeguard) for 3 days prior to and on the morning of surgery, using a fresh towel after each shower. (Please begin this process with fresh bed linens.) Do not apply any lotions, powders, or deodorants after the shower on the day of your procedure. If applicable, please do not shave the operative site for 48 hours prior to surgery. 7. Wear comfortable clothes. Wear glasses instead of contacts. Do not bring any jewelry or money (other than copays or fees as instructed). Do not wear make-up, particularly mascara, the morning of your surgery. Do not wear nail polish, particularly if you are having foot /hand surgery. Wear your hair loose or down, no ponytails, buns, berna pins or clips. All body piercings must be removed. 8. You should understand that if you do not follow these instructions your surgery may be cancelled. If your physical condition changes (i.e. fever, cold or flu) please contact your surgeon as soon as possible. 9. It is important that you be on time. If a situation occurs where you may be late, or if you have any questions or problems, please call [phone].    10. Your surgery time may be subject to change. You will receive a phone call the day prior to surgery to confirm your arrival time. 11. Pediatric patients: please bring a change of clothes, diapers, bottle/sippy cup, pacifier, etc.      Special Instructions: Take all medications and inhalers, as prescribed, on the morning of surgery with a sip of water EXCEPT: no diabetic meds, vitamins, and/or supplements. I understand a pre-operative phone call will be made to verify my surgery time. In the event that I am not available, I give permission for a message to be left on my answering service and/or with another person? yes         ___________________      ___________________      ________________  Pt verbalized understanding of preop instructions via telephone.   (Signature of Patient)          (Witness)                   (Date and Time)

## 2018-02-13 ENCOUNTER — ANESTHESIA EVENT (OUTPATIENT)
Dept: SURGERY | Age: 53
End: 2018-02-13
Payer: COMMERCIAL

## 2018-02-14 ENCOUNTER — HOSPITAL ENCOUNTER (OUTPATIENT)
Age: 53
Setting detail: OUTPATIENT SURGERY
Discharge: HOME | End: 2018-02-14
Attending: COLON & RECTAL SURGERY | Admitting: COLON & RECTAL SURGERY
Payer: COMMERCIAL

## 2018-02-14 ENCOUNTER — ANESTHESIA (OUTPATIENT)
Dept: SURGERY | Age: 53
End: 2018-02-14
Payer: COMMERCIAL

## 2018-02-14 VITALS
DIASTOLIC BLOOD PRESSURE: 83 MMHG | HEART RATE: 82 BPM | SYSTOLIC BLOOD PRESSURE: 140 MMHG | HEIGHT: 69 IN | TEMPERATURE: 98 F | BODY MASS INDEX: 34.21 KG/M2 | RESPIRATION RATE: 11 BRPM | WEIGHT: 231 LBS | OXYGEN SATURATION: 98 %

## 2018-02-14 DIAGNOSIS — K62.4 ANAL STRICTURE: Primary | ICD-10-CM

## 2018-02-14 PROCEDURE — 74011250636 HC RX REV CODE- 250/636: Performed by: COLON & RECTAL SURGERY

## 2018-02-14 PROCEDURE — 76060000073 HC AMB SURG ANES FIRST 0.5 HR: Performed by: COLON & RECTAL SURGERY

## 2018-02-14 PROCEDURE — 74011000250 HC RX REV CODE- 250

## 2018-02-14 PROCEDURE — 76030000002 HC AMB SURG OR TIME FIRST 0.: Performed by: COLON & RECTAL SURGERY

## 2018-02-14 PROCEDURE — 76210000050 HC AMBSU PH II REC 0.5 TO 1 HR: Performed by: COLON & RECTAL SURGERY

## 2018-02-14 PROCEDURE — 74011250636 HC RX REV CODE- 250/636

## 2018-02-14 PROCEDURE — 77030020255 HC SOL INJ LR 1000ML BG: Performed by: COLON & RECTAL SURGERY

## 2018-02-14 PROCEDURE — 76210000040 HC AMBSU PH I REC FIRST 0.5 HR: Performed by: COLON & RECTAL SURGERY

## 2018-02-14 PROCEDURE — 88305 TISSUE EXAM BY PATHOLOGIST: CPT | Performed by: COLON & RECTAL SURGERY

## 2018-02-14 PROCEDURE — 77030009426 HC FCPS BIOP ENDOSC BSC -B: Performed by: COLON & RECTAL SURGERY

## 2018-02-14 PROCEDURE — 74011250636 HC RX REV CODE- 250/636: Performed by: ANESTHESIOLOGY

## 2018-02-14 PROCEDURE — 74011250637 HC RX REV CODE- 250/637

## 2018-02-14 PROCEDURE — 77030021352 HC CBL LD SYS DISP COVD -B: Performed by: COLON & RECTAL SURGERY

## 2018-02-14 RX ORDER — ACETAMINOPHEN 10 MG/ML
INJECTION, SOLUTION INTRAVENOUS
Status: COMPLETED
Start: 2018-02-14 | End: 2018-02-14

## 2018-02-14 RX ORDER — MORPHINE SULFATE 10 MG/ML
2 INJECTION, SOLUTION INTRAMUSCULAR; INTRAVENOUS
Status: DISCONTINUED | OUTPATIENT
Start: 2018-02-14 | End: 2018-02-14 | Stop reason: HOSPADM

## 2018-02-14 RX ORDER — ACETAMINOPHEN 10 MG/ML
1000 INJECTION, SOLUTION INTRAVENOUS ONCE
Status: COMPLETED | OUTPATIENT
Start: 2018-02-14 | End: 2018-02-14

## 2018-02-14 RX ORDER — SODIUM CHLORIDE 0.9 % (FLUSH) 0.9 %
5-10 SYRINGE (ML) INJECTION AS NEEDED
Status: DISCONTINUED | OUTPATIENT
Start: 2018-02-14 | End: 2018-02-14 | Stop reason: HOSPADM

## 2018-02-14 RX ORDER — SODIUM CHLORIDE, SODIUM LACTATE, POTASSIUM CHLORIDE, CALCIUM CHLORIDE 600; 310; 30; 20 MG/100ML; MG/100ML; MG/100ML; MG/100ML
25 INJECTION, SOLUTION INTRAVENOUS CONTINUOUS
Status: DISCONTINUED | OUTPATIENT
Start: 2018-02-14 | End: 2018-02-14 | Stop reason: HOSPADM

## 2018-02-14 RX ORDER — OXYCODONE HYDROCHLORIDE 5 MG/1
TABLET ORAL
Status: COMPLETED
Start: 2018-02-14 | End: 2018-02-14

## 2018-02-14 RX ORDER — HYDROMORPHONE HYDROCHLORIDE 1 MG/ML
.2-.5 INJECTION, SOLUTION INTRAMUSCULAR; INTRAVENOUS; SUBCUTANEOUS ONCE
Status: DISCONTINUED | OUTPATIENT
Start: 2018-02-14 | End: 2018-02-14 | Stop reason: HOSPADM

## 2018-02-14 RX ORDER — LIDOCAINE HYDROCHLORIDE 20 MG/ML
INJECTION, SOLUTION EPIDURAL; INFILTRATION; INTRACAUDAL; PERINEURAL AS NEEDED
Status: DISCONTINUED | OUTPATIENT
Start: 2018-02-14 | End: 2018-02-14 | Stop reason: HOSPADM

## 2018-02-14 RX ORDER — PROPOFOL 10 MG/ML
INJECTION, EMULSION INTRAVENOUS AS NEEDED
Status: DISCONTINUED | OUTPATIENT
Start: 2018-02-14 | End: 2018-02-14 | Stop reason: HOSPADM

## 2018-02-14 RX ORDER — OXYCODONE HYDROCHLORIDE 5 MG/1
5 TABLET ORAL ONCE
Status: COMPLETED | OUTPATIENT
Start: 2018-02-14 | End: 2018-02-14

## 2018-02-14 RX ORDER — LIDOCAINE HYDROCHLORIDE 10 MG/ML
0.1 INJECTION, SOLUTION EPIDURAL; INFILTRATION; INTRACAUDAL; PERINEURAL AS NEEDED
Status: DISCONTINUED | OUTPATIENT
Start: 2018-02-14 | End: 2018-02-14 | Stop reason: HOSPADM

## 2018-02-14 RX ORDER — OXYCODONE AND ACETAMINOPHEN 5; 325 MG/1; MG/1
1 TABLET ORAL
Qty: 50 TAB | Refills: 0 | Status: SHIPPED | OUTPATIENT
Start: 2018-02-14

## 2018-02-14 RX ORDER — SODIUM CHLORIDE 0.9 % (FLUSH) 0.9 %
5-10 SYRINGE (ML) INJECTION EVERY 8 HOURS
Status: DISCONTINUED | OUTPATIENT
Start: 2018-02-14 | End: 2018-02-14 | Stop reason: HOSPADM

## 2018-02-14 RX ORDER — OXYCODONE AND ACETAMINOPHEN 5; 325 MG/1; MG/1
1 TABLET ORAL
Status: DISCONTINUED | OUTPATIENT
Start: 2018-02-14 | End: 2018-02-14 | Stop reason: HOSPADM

## 2018-02-14 RX ORDER — MESALAMINE 1000 MG/1
1000 SUPPOSITORY RECTAL
Qty: 30 SUPPOSITORY | Refills: 1 | Status: SHIPPED | OUTPATIENT
Start: 2018-02-14

## 2018-02-14 RX ORDER — KETOROLAC TROMETHAMINE 30 MG/ML
INJECTION, SOLUTION INTRAMUSCULAR; INTRAVENOUS
Status: DISCONTINUED
Start: 2018-02-14 | End: 2018-02-14 | Stop reason: HOSPADM

## 2018-02-14 RX ORDER — KETOROLAC TROMETHAMINE 30 MG/ML
30 INJECTION, SOLUTION INTRAMUSCULAR; INTRAVENOUS
Status: COMPLETED | OUTPATIENT
Start: 2018-02-14 | End: 2018-02-14

## 2018-02-14 RX ORDER — SODIUM CHLORIDE 9 MG/ML
INJECTION, SOLUTION INTRAVENOUS
Status: DISCONTINUED | OUTPATIENT
Start: 2018-02-14 | End: 2018-02-14 | Stop reason: HOSPADM

## 2018-02-14 RX ORDER — ALBUTEROL SULFATE 90 UG/1
AEROSOL, METERED RESPIRATORY (INHALATION)
COMMUNITY

## 2018-02-14 RX ORDER — FENTANYL CITRATE 50 UG/ML
INJECTION, SOLUTION INTRAMUSCULAR; INTRAVENOUS
Status: COMPLETED
Start: 2018-02-14 | End: 2018-02-14

## 2018-02-14 RX ORDER — DIPHENHYDRAMINE HYDROCHLORIDE 50 MG/ML
12.5 INJECTION, SOLUTION INTRAMUSCULAR; INTRAVENOUS AS NEEDED
Status: DISCONTINUED | OUTPATIENT
Start: 2018-02-14 | End: 2018-02-14 | Stop reason: HOSPADM

## 2018-02-14 RX ORDER — FENTANYL CITRATE 50 UG/ML
25 INJECTION, SOLUTION INTRAMUSCULAR; INTRAVENOUS
Status: DISCONTINUED | OUTPATIENT
Start: 2018-02-14 | End: 2018-02-14 | Stop reason: HOSPADM

## 2018-02-14 RX ADMIN — FENTANYL CITRATE 25 MCG: 50 INJECTION, SOLUTION INTRAMUSCULAR; INTRAVENOUS at 12:09

## 2018-02-14 RX ADMIN — KETOROLAC TROMETHAMINE 30 MG: 30 INJECTION, SOLUTION INTRAMUSCULAR at 11:55

## 2018-02-14 RX ADMIN — SODIUM CHLORIDE: 9 INJECTION, SOLUTION INTRAVENOUS at 11:18

## 2018-02-14 RX ADMIN — OXYCODONE HYDROCHLORIDE 5 MG: 5 TABLET ORAL at 12:21

## 2018-02-14 RX ADMIN — PROPOFOL 40 MG: 10 INJECTION, EMULSION INTRAVENOUS at 11:30

## 2018-02-14 RX ADMIN — PROPOFOL 40 MG: 10 INJECTION, EMULSION INTRAVENOUS at 11:33

## 2018-02-14 RX ADMIN — LIDOCAINE HYDROCHLORIDE 20 MG: 20 INJECTION, SOLUTION EPIDURAL; INFILTRATION; INTRACAUDAL; PERINEURAL at 11:19

## 2018-02-14 RX ADMIN — PROPOFOL 40 MG: 10 INJECTION, EMULSION INTRAVENOUS at 11:24

## 2018-02-14 RX ADMIN — PROPOFOL 40 MG: 10 INJECTION, EMULSION INTRAVENOUS at 11:36

## 2018-02-14 RX ADMIN — FENTANYL CITRATE 25 MCG: 50 INJECTION, SOLUTION INTRAMUSCULAR; INTRAVENOUS at 12:02

## 2018-02-14 RX ADMIN — ACETAMINOPHEN 1000 MG: 10 INJECTION, SOLUTION INTRAVENOUS at 11:59

## 2018-02-14 RX ADMIN — PROPOFOL 40 MG: 10 INJECTION, EMULSION INTRAVENOUS at 11:28

## 2018-02-14 RX ADMIN — PROPOFOL 40 MG: 10 INJECTION, EMULSION INTRAVENOUS at 11:25

## 2018-02-14 RX ADMIN — PROPOFOL 50 MG: 10 INJECTION, EMULSION INTRAVENOUS at 11:19

## 2018-02-14 RX ADMIN — SODIUM CHLORIDE, SODIUM LACTATE, POTASSIUM CHLORIDE, AND CALCIUM CHLORIDE 25 ML/HR: 600; 310; 30; 20 INJECTION, SOLUTION INTRAVENOUS at 09:33

## 2018-02-14 RX ADMIN — PROPOFOL 40 MG: 10 INJECTION, EMULSION INTRAVENOUS at 11:23

## 2018-02-14 NOTE — PERIOP NOTES
Permission received to review discharge instructions and discuss private health information with Boyfriend, Vignesh Villa.

## 2018-02-14 NOTE — INTERVAL H&P NOTE
H&P Update:  Hiren Mg was seen and examined. History and physical has been reviewed. The patient has been examined.  There have been no significant clinical changes since the completion of the originally dated History and Physical.    Signed By: Kari Beyer MD     February 14, 2018 11:06 AM

## 2018-02-14 NOTE — OP NOTES
[facility]  ACUTE CARE OP NOTE    Chavo Austin  MR#: [id number]  : 1965  ACCOUNT #: [de-identified]   DATE OF SERVICE: 2018    PREOPERATIVE DIAGNOSIS:  Crohn's stricture of anal outlet at anorectal junction with recurrence. POSTOPERATIVE DIAGNOSIS:  Crohn's stricture of anal outlet at anorectal junction with recurrence of stricture, but not to a severe degree as initially diagnosed 1 month ago. PROCEDURES PERFORMED:  Exam under anesthesia, dilation of anorectal stricture from a #14 to a #20 Hegar dilator and a flexible endoscopy of the rectal Nikki stump with biopsies. SURGEON:  Danielle Recio MD     ANESTHESIA:  IV sedation with propofol per Anesthesia. BLOOD LOSS:  15 mL. REPLACEMENTS:  None. SPECIMENS:  Rectal biopsies. COMPLICATIONS: None    INDICATIONS:  The patient is a 64-year-old white female with a history of Crohn's anorectal stricture at the bottom of her Nikki's pouch. She underwent dilatation from a #4 to a #20 Hegar dilator set at her last exam about 6 weeks ago. She has tightened things back down again and I would have expected this given that there is acute Crohn's at the area. Subsequently, she is in today for redilatation so as to keep her rectum open to allow her to get some Canasa suppositories in the region. She is aware of the risks of procedure including bleeding, perforation, the risk of missing small polyps and she is agreeable to proceed. PROCEDURE IN DETAIL:  After uneventful induction of IV sedation with propofol, the patient was placed in supine lithotomy position and a digital exam done. The anal canal would barely admit the tip of my finger at the anorectal junction, but we were able to dilate this nicely from a #14 up to a #20 Hegar dilator.   Given that we started with a #4 at the last procedure and started with a #14 today, she is doing much better shape than she was before and indeed the dilated swollen rectum that was obstructed by the previous stricture has now decompressed and returned back to a much more normal size in terms of its length and diameter. Once the dilatation was done, we placed a pediatric colonoscope and simply passed through the stricture and took some rectal biopsies to confirm. The scope was then removed and the area then dressed and the procedure terminated. She tolerated the operation well. She remained stable and left with a benign abdomen. We will see her back in the office in about 10 days. We will start her on Canasa suppositories 1000 mg per rectum each evening.       MD BETSEY Saldivar / [place]  D: 02/14/2018 12:03     T: 02/14/2018 13:19  JOB #: [id number]  CC: Alexi Reilly  CC: Macey Maher

## 2018-02-14 NOTE — ANESTHESIA POSTPROCEDURE EVALUATION
Post-Anesthesia Evaluation and Assessment    Patient: Palomo Hazel MRN: [id number]  SSN: [id number]    YOB: 1965  Age: 47 y.o. Sex: female       Cardiovascular Function/Vital Signs  Visit Vitals    /83 (BP 1 Location: Left arm, BP Patient Position: At rest)    Pulse 82    Temp 36.7 °C (98 °F)    Resp 11    Ht 5' 9\" (1.753 m)    Wt 104.8 kg (231 lb)    SpO2 98%    BMI 34.11 kg/m2       Patient is status post general, total IV anesthesia anesthesia for Procedure(s):  DILATION RECTAL STRICTURE UNDER ANESTHESIA,  FLEXIBLE SIGMOIDOSCOPY  COLON BIOPSY. Nausea/Vomiting: None    Postoperative hydration reviewed and adequate. Pain:  Pain Scale 1: Numeric (0 - 10) (02/14/18 1229)  Pain Intensity 1: 2 (02/14/18 1229)   Managed    Neurological Status:   Neuro (WDL): Within Defined Limits (02/14/18 1229)  Neuro  Neurologic State: Alert (02/14/18 1229)   At baseline    Mental Status and Level of Consciousness: Arousable    Pulmonary Status:   O2 Device: Room air (02/14/18 1229)   Adequate oxygenation and airway patent    Complications related to anesthesia: None    Post-anesthesia assessment completed.  No concerns    Signed By: Melanie Alvarado MD     February 14, 2018

## 2018-02-14 NOTE — PERIOP NOTES
Ilan Sewell [facility]  1965  [id number]    Situation:  Verbal report given from: Betina Meza Cha, CRNA RN  Procedure: Procedure(s):  DILATION RECTAL STRICTURE UNDER ANESTHESIA,  FLEXIBLE SIGMOIDOSCOPY  COLON BIOPSY    Background:    Preoperative diagnosis: CHRON'S   ANAL CANAL STRICTURE     Postoperative diagnosis: anal canal stricture    :  Dr. Nirmal Flores    Assistant(s): Circ-1: Familia Charles RN  Scrub RN-1: Miriam Jackson RN    Specimens:   ID Type Source Tests Collected by Time Destination   1 : rectal biopsy Preservative Rectal  Donna Cramer MD 2/14/2018 1130 Pathology       Assessment:  Intra-procedure medications         Anesthesia gave intra-procedure sedation and medications, see anesthesia flow sheet     Intravenous fluids: LR@ KVO     Vital signs stable

## 2018-02-14 NOTE — ANESTHESIA PREPROCEDURE EVALUATION
Anesthetic History   No history of anesthetic complications            Review of Systems / Medical History  Patient summary reviewed, nursing notes reviewed and pertinent labs reviewed    Pulmonary          Smoker (quit 01/18)  Asthma : well controlled       Neuro/Psych   Within defined limits           Cardiovascular    Hypertension              Exercise tolerance: >4 METS     GI/Hepatic/Renal     GERD          Comments: Crohn's dz; no recent steroids  Has Ileostomy Endo/Other        Obesity     Other Findings              Physical Exam    Airway  Mallampati: II  TM Distance: < 4 cm  Neck ROM: normal range of motion       Comments: Small mouth Cardiovascular    Rhythm: regular  Rate: normal      Pertinent negatives: No murmur   Dental    Dentition: Full upper dentures and Full lower dentures     Pulmonary  Breath sounds clear to auscultation               Abdominal  GI exam deferred       Other Findings            Anesthetic Plan    ASA: 3  Anesthesia type: general          Induction: Intravenous  Anesthetic plan and risks discussed with: Patient

## 2018-02-14 NOTE — DISCHARGE INSTRUCTIONS
Carson Mcqueen MD, FACS  Dorian Woodward MD, FACS  Imer Savage. Ena Li MD, [address] [facility] Radha Dinero MD, [address] [facility] Marie Cano MD, FACS  Abel Garcia. MD Caroline Altamirano MD    [facility] Discharge Instructions for Ambulatory 23-Hour Surgery Patients    1. Advance to high fiber diet as tolerated. 2. Remove dressing at anytime, and replace as needed. 3. Take 2 to 4 sitz baths today (warm water baths for 15-20 minutes). Begin four (4) times a day the day after surgery. 4. Apply Nupercainal or recticare Ointment (does not need a prescription) to the anal area after sitz baths, place a dry 4x4 gauze over the are between the buttocks. 5. Take pain medication as prescribed. (NO DRIVING WHILE ON PAIN MEDICATION). 6. No lifting any objects weighing more than 20 pounds. 7. No sitting more than 45 minutes without standing, walking, or lying down. 8. You may walk as desired. 9.  Please schedule an appointment in the office within 10-14 days. Call ahead to schedule your appointment [phone]. 10.  Call Exchange [phone] if you have any problems or questions after   hours. 11.  Expect slight bright red blood up to four (4) weeks from surgery. 12.  Take one canasa suppository each nite.    >>>You received an IV form of Tylenol 1000mg during your surgery, you may take tylenol (or pain medication containing Tylenol or Acetaminophen) in 6 hours at 6 pm    .<<<>>>You received Toradol during your surgery. You may not take any form of NSAIDS (non steroidal anti inflammatory drugs) such as Advil, Ibuprofen, Aleve, Motrin until 6 pm     .<<<DO NOT TAKE TYLENOL/ACETAMINOPHEN WITH PERCOCET, LORTAB, [facility] OR VICODEN. TAKE NARCOTIC PAIN MEDICATIONS WITH FOOD     If given 2 pain narcotics do NOT take together! Narcotics tend to be constipating, we suggest taking a stool softener such as Colace or Miralax (follow package instructions).     DO NOT DRIVE WHILE TAKING NARCOTIC PAIN MEDICATIONS. DO NOT TAKE SLEEPING MEDICATIONS OR ANTIANXIETY MEDICATIONS WHILE TAKING NARCOTIC PAIN MEDICATIONS,  ESPECIALLY THE NIGHT OF ANESTHESIA. CPAP PATIENTS BE SURE TO WEAR MACHINE WHENEVER NAPPING OR SLEEPING. DISCHARGE SUMMARY from Nurse    The following personal items collected during your admission are returned to you:   Dental Appliance: Dental Appliances: Lowers, Uppers, With patient  Vision: Visual Aid: Glasses (in recovery)  Hearing Aid:    Jewelry: Jewelry: None  Clothing: Clothing: With patient  Other Valuables: Other Valuables: Magan Rader (dentures/glasses put in recovery, purse given to BF)  Valuables sent to safe:        PATIENT INSTRUCTIONS:    After General Anesthesia or Intravenous Sedation, for 24 hours or while taking prescription Narcotics:        Someone should be with you for the next 24 hours. For your own safety, a responsible adult must drive you home. · Limit your activities  · Recommended activity: Rest today, up with assistance today. Do not climb stairs or shower unattended for the next 24 hours. · Please start with a soft bland diet and advance as tolerated (no nausea) to regular diet. · If you have a sore throat you should try the following: fluids, warm salt water gargles, or throat lozenges. If it does not improve after several days please follow up with your primary physician. · Do not drive and operate hazardous machinery  · Do not make important personal or business decisions  · Do  not drink alcoholic beverages  · If you have not urinated within 8 hours after discharge, please contact your surgeon on call.     Report the following to your surgeon:  · Excessive pain, swelling, redness or odor of or around the surgical area  · Temperature over 100.5  · Nausea and vomiting lasting longer than 4 hours or if unable to take medications  · Any signs of decreased circulation or nerve impairment to extremity: change in color, persistent numbness, tingling, coldness or increase pain      · You will receive a Post Operative Call from one of the Recovery Room Nurses on the day after your surgery to check on you. It is very important for us to know how you are recovering after your surgery. If you have an issue or need to speak with someone, please call your surgeon, do not wait for the post operative call. · You may receive an e-mail or letter in the mail from [place] regarding your experience with us in the Ambulatory Surgery Unit. Your feedback is valuable to us and we appreciate your participation in the survey. · If the above instructions are not adequate, please contact Karol Anderson RN, Liliane anesthesia Nurse Manager or our Anesthesiologist, at [phone]. If you are having problems after your surgery, call the physician at his office number. · We wish you a speedy recovery ? What to do at Home:      *  Please give a list of your current medications to your Primary Care Provider. *  Please update this list whenever your medications are discontinued, doses are      changed, or new medications (including over-the-counter products) are added. *  Please carry medication information at all times in case of emergency situations. These are general instructions for a healthy lifestyle:    No smoking/ No tobacco products/ Avoid exposure to second hand smoke    Surgeon General's Warning:  Quitting smoking now greatly reduces serious risk to your health.     Obesity, smoking, and sedentary lifestyle greatly increases your risk for illness    A healthy diet, regular physical exercise & weight monitoring are important for maintaining a healthy lifestyle    You may be retaining fluid if you have a history of heart failure or if you experience any of the following symptoms:  Weight gain of 3 pounds or more overnight or 5 pounds in a week, increased swelling in our hands or feet or shortness of breath while lying flat in bed.  Please call your doctor as soon as you notice any of these symptoms; do not wait until your next office visit. Recognize signs and symptoms of STROKE:    B - Balance  E - Eyes    F-  Face looks uneven    A-  Arms unable to move or move even    S-  Speech slurred or non-existent    T-  Time-call 911 as soon as signs and symptoms begin-DO NOT go       Back to bed or wait to see if you get better-TIME IS BRAIN. If you have not received your influenza and/or pneumococcal vaccine, please follow up with your primary care physician. The discharge information has been reviewed with the patient and spouse. The patient and spouse verbalized understanding.

## 2018-02-14 NOTE — IP AVS SNAPSHOT
[address] 
[phone] Patient: Gabe Elmore MRN: [id number] :1965 About your hospitalization You were admitted on:  2018 You last received care in the:  [facility] PACU You were discharged on:  2018 Why you were hospitalized Your primary diagnosis was: Anal Stricture Follow-up Information Follow up With Details Comments Contact Info La Telles MD   [address] [place] [address] [phone] Discharge Orders None A check shraddha indicates which time of day the medication should be taken. My Medications START taking these medications Instructions Each Dose to Equal  
 Morning Noon Evening Bedtime  
 mesalamine 1,000 mg suppository Commonly known as:  CANASA Your last dose was: Your next dose is: Insert 1 Suppository into rectum nightly. 1000 mg  
    
   
   
   
  
 oxyCODONE-acetaminophen 5-325 mg per tablet Commonly known as:  PERCOCET Your last dose was: Your next dose is: Take 1 Tab by mouth every four (4) hours as needed for Pain. Max Daily Amount: 6 Tabs. 1 Tab CONTINUE taking these medications Instructions Each Dose to Equal  
 Morning Noon Evening Bedtime  
 albuterol 90 mcg/actuation inhaler Commonly known as:  PROVENTIL HFA, VENTOLIN HFA, PROAIR HFA Your last dose was: Your next dose is: Take  by inhalation. losartan 100 mg tablet Commonly known as:  COZAAR Your last dose was: Your next dose is: Take 100 mg by mouth daily. 100 mg  
    
   
   
   
  
 montelukast 10 mg tablet Commonly known as:  SINGULAIR Your last dose was: Your next dose is: Take 10 mg by mouth daily.   
 10 mg  
    
   
   
 Where to Get Your Medications Information on where to get these meds will be given to you by the nurse or doctor. ! Ask your nurse or doctor about these medications  
  mesalamine 1,000 mg suppository  
 oxyCODONE-acetaminophen 5-325 mg per tablet Discharge Instructions Terri Rivera MD, FACS Dorian BATRES. Penny Garvey MD, FACS Dom BATRES. Akosua Tracy MD, FACS [facility]. Susan Ralph MD, FACS Darleen MATOS. Mercedes Rodriguez MD, FACS Jay Moralez. MD Ranjith Hennessy MD 
 
[facility] Discharge Instructions for Ambulatory 23-Hour Surgery Patients 1. Advance to high fiber diet as tolerated. 2. Remove dressing at anytime, and replace as needed. 3. Take 2 to 4 sitz baths today (warm water baths for 15-20 minutes). Begin four (4) times a day the day after surgery. 4. Apply Nupercainal or recticare Ointment (does not need a prescription) to the anal area after sitz baths, place a dry 4x4 gauze over the are between the buttocks. 5. Take pain medication as prescribed. (NO DRIVING WHILE ON PAIN MEDICATION). 6. No lifting any objects weighing more than 20 pounds. 7. No sitting more than 45 minutes without standing, walking, or lying down. 8. You may walk as desired. 9.  Please schedule an appointment in the office within 10-14 days. Call ahead to schedule your appointment [phone]. 10.  Call Exchange [phone] if you have any problems or questions after   hours. 11.  Expect slight bright red blood up to four (4) weeks from surgery. 12.  Take one canasa suppository each nite. 
 
>>>You received an IV form of Tylenol 1000mg during your surgery, you may take tylenol (or pain medication containing Tylenol or Acetaminophen) in 6 hours at 6 pm 
 
.<<<>>>You received Toradol during your surgery.  You may not take any form of NSAIDS (non steroidal anti inflammatory drugs) such as Advil, Ibuprofen, Aleve, Motrin until 6 pm  
 
 .<<<DO NOT TAKE TYLENOL/ACETAMINOPHEN WITH PERCOCET, LORTAB, [address] TAKE NARCOTIC PAIN MEDICATIONS WITH FOOD If given 2 pain narcotics do NOT take together! Narcotics tend to be constipating, we suggest taking a stool softener such as Colace or Miralax (follow package instructions). DO NOT DRIVE WHILE TAKING NARCOTIC PAIN MEDICATIONS. DO NOT TAKE SLEEPING MEDICATIONS OR ANTIANXIETY MEDICATIONS WHILE TAKING NARCOTIC PAIN MEDICATIONS,  ESPECIALLY THE NIGHT OF ANESTHESIA. CPAP PATIENTS BE SURE TO WEAR MACHINE WHENEVER NAPPING OR SLEEPING. DISCHARGE SUMMARY from Nurse The following personal items collected during your admission are returned to you:  
Dental Appliance: Dental Appliances: Lowers, Uppers, With patient Vision: Visual Aid: Glasses (in recovery) Hearing Aid:   
Jewelry: Jewelry: None Clothing: Clothing: With patient Other Valuables: Other Valuables: Eyeglasses, Purse (dentures/glasses put in recovery, purse given to BF) Valuables sent to safe:   
 
 
PATIENT INSTRUCTIONS: 
 
 
B - Balance E - Eyes F-  Face looks uneven A-  Arms unable to move or move even S-  Speech slurred or non-existent T-  Time-call 911 as soon as signs and symptoms begin-DO NOT go Back to bed or wait to see if you get better-TIME IS BRAIN. If you have not received your influenza and/or pneumococcal vaccine, please follow up with your primary care physician. The discharge information has been reviewed with the patient and spouse. The patient and spouse verbalized understanding. Introducing [facility]! Kemal Weaver introduces [employer] patient portal. Now you can access parts of your medical record, email your doctor's office, and request medication refills online. 1. In your internet browser, go to [email or web address] 2. Click on the First Time User? Click Here link in the Sign In box. You will see the New Member Sign Up page. 3. Enter your [employer] Access Code exactly as it appears below. You will not need to use this code after youve completed the sign-up process. If you do not sign up before the expiration date, you must request a new code. · [employer] Access Code: [email or web address] Expires: 4/5/2018  1:49 PM 
 
4. Enter the last four digits of your Social Security Number (xxxx) and Date of Birth (mm/dd/yyyy) as indicated and click Submit. You will be taken to the next sign-up page. 5. Create a Smartsheet ID. This will be your Smartsheet login ID and cannot be changed, so think of one that is secure and easy to remember. 6. Create a Smartsheet password. You can change your password at any time. 7. Enter your Password Reset Question and Answer. This can be used at a later time if you forget your password. 8. Enter your e-mail address. You will receive e-mail notification when new information is available in [address] 9. Click Sign Up. You can now view and download portions of your medical record. 10. Click the Download Summary menu link to download a portable copy of your medical information. If you have questions, please visit the Frequently Asked Questions section of the Smartsheet website. Remember, Smartsheet is NOT to be used for urgent needs. For medical emergencies, dial 911. Now available from your iPhone and Android! Providers Seen During Your Hospitalization Provider Specialty Primary office phone Lenore Paul MD Colon and Rectal Surgery [phone] Your Primary Care Physician (PCP) Primary Care Physician Office Phone Office Fax [address] [phone] You are allergic to the following No active allergies Recent Documentation Height Weight BMI OB Status Smoking Status 1.753 m 104.8 kg 34.11 kg/m2 Hysterectomy Former Smoker Emergency Contacts Name Discharge Info Relation Home Work [facility] DISCHARGE CAREGIVER [3] Other Relative [6] [phone] Patient Belongings The following personal items are in your possession at time of discharge: 
  Dental Appliances: Lowers, Uppers, With patient  Visual Aid: Glasses (in recovery)      Home Medications: None   Jewelry: None  Clothing: With patient    Other Valuables: Eyeglasses, Purse (dentures/glasses put in recovery, purse given to BF) Discharge Instructions Attachments/References MEFS - OXYCODONE/ACETAMINOPHEN (PERCOCET, ROXICET) - (BY MOUTH) (ENGLISH) MEFS - MESALAMINE (CANASA, ROWASA, SFROWASA) - (INTO THE RECTUM) (ENGLISH) Patient Handouts Oxycodone/Acetaminophen (Percocet, Roxicet) - (By mouth) Why this medicine is used:  
Treats pain. This medicine contains a narcotic pain reliever. Contact a nurse or doctor right away if you have: 
· Extreme weakness, shallow breathing, slow heartbeat · Sweating or cold, clammy skin · Skin blisters, rash, or peeling Common side effects: 
· Constipation · Nausea, vomiting · Tiredness © 2017 [address] Information is for End User's use only and may not be sold, redistributed or otherwise used for commercial purposes. Mesalamine (Canasa, Rowasa, sfRowasa) - (Into the rectum) Why this medicine is used:  
Treats ulcerative proctitis. Contact a nurse or doctor right away if you have: 
· Fast, slow, or pounding heartbeat, chest pain, trouble breathing · Dark urine or pale stools, yellow skin or eyes · Change in how much or how often you urinate · Diarrhea, nausea, vomiting, loss of appetite, cramping, stomach pain · Fever, headache, rash © 2017 [address] Information is for End User's use only and may not be sold, redistributed or otherwise used for commercial purposes. Please provide this summary of care documentation to your next provider. Signatures-by signing, you are acknowledging that this After Visit Summary has been reviewed with you and you have received a copy. Patient Signature:  ____________________________________________________________ Date:  ____________________________________________________________  
  
[facility] Provider Signature:  ____________________________________________________________ Date:  ____________________________________________________________

## 2018-02-14 NOTE — H&P
[address]  ACUTE CARE HISTORY AND PHYSICAL    Thaddeus Betancourt  MR#: [id number]  : 1965  ACCOUNT #: [de-identified]   DATE OF SERVICE: 2018    CHIEF COMPLAINT:  Crohn disease with anal stricture, now for repeat dilatation. HISTORY OF PRESENT ILLNESS:  Patient is a 64-year-old white female who we saw earlier this year from the [facility]. She has a history of Crohn disease and had undergone total abdominal colectomy and a Nikki's procedure. She developed a stricture at the anal outlet and subsequently underwent dilatation, and a point of fact, the stricture had obstructed her rectum at the anorectal junction and dilatation was performed and helped to open this up, in as much as she had distended her rectum and was having a tremendous pressure from above. This was effectively a closed loop rectal stump. She underwent a dilatation on 2018 and was significantly improved. She has, as expected, tightened back down a bit, having started out at a #4 Hegar dilator and had been taken up to a #20. We saw her in the office subsequently and she is going to need one more dilatation, I think, to hold this rectal stump open and is in today for this. She would do well with Canasa, but at the current time, she is still too tender and will, I think, do better after the subsequent repeat dilatation. Subsequently, she is in for this today. PAST MEDICAL HISTORY:  Significant for Crohn disease with history of severe pancreatitis, history of tonsillectomy, adenoidectomy subtotal colectomy with a Nikki stump and  cholecystectomy, MRSA in the past, GE reflux and hypertension. MEDICATIONS:  Have included losartan, montelukast, omeprazole, Zyrtec, iron, vitamins, glucosamine, as well as methocarbamol, hyoscyamine, Nitro-Bid ointment, and Valium    SOCIAL HISTORY:  Rarely drinks. Smokes half pack of cigarettes a day.     FAMILY HISTORY:  Significant for lung cancer, hypertension, thyroid disease and heart disease. REVIEW OF SYSTEMS:  She has some occasional heartburn, some dysuria and some shortness of breath as well as joint pains. PHYSICAL EXAMINATION  GENERAL:  Pleasant, overweight 60-year-old white female. HEENT:  Clear. NECK:  Supple. LUNGS:  Clear. CARDIAC:  Regular. ABDOMEN:  Benign with an ileostomy in place. Obese. Bowel sounds active. RECTAL:  Reveals recurrence of her stricture at the anal canal at the anorectal junction. She will need one more dilatation to help hold this area open. PLAN:  We will move forward with exam under anesthesia and dilatation today. She is aware of the risks including bleeding, perforation and the risk of missing small polyps and she is agreeable to proceed. We will move forward with exam today.       MD BETSEY Goodwin / TN  D: 02/14/2018 01:22     T: 02/14/2018 05:55  JOB #: [id number]  CC: Cyndy Cifuentes  CC: Melisa Bhakta

## 2018-02-14 NOTE — BRIEF OP NOTE
BRIEF OPERATIVE NOTE    Date of Procedure: 2/14/2018   Preoperative Diagnosis: CHRON'S   ANAL CANAL STRICTURE   Postoperative Diagnosis: anal canal stricture    Procedure(s):  DILATION RECTAL STRICTURE UNDER ANESTHESIA,  FLEXIBLE SIGMOIDOSCOPY  COLON BIOPSY  Surgeon(s) and Role:     * Milana Duverney, MD - Primary         Assistant Staff: None      Surgical Staff:  Circ-1: Amanda Moy RN  Scrub RN-1: Kavon Thacker RN  Event Time In   Incision Start 1126   Incision Close 1135     Anesthesia: General   Estimated Blood Loss: 15 cc's  Specimens:   ID Type Source Tests Collected by Time Destination   1 : rectal biopsy Preservative Rectal  Milana Duverney, MD 2/14/2018 1130 Pathology      Findings: recurrent anorectal crohn's stricture, NOT as severe as first exam;  Dilated from 15 to 21 hegar dilator;  Rectum still inflamation -- biopsied   Complications: none  Implants: * No implants in log *

## 2021-08-18 NOTE — ANESTHESIA POSTPROCEDURE EVALUATION
May resume golf. Post-Anesthesia Evaluation and Assessment    Patient: Martin Wright MRN: [id number]  SSN: [id number]    YOB: 1965  Age: 47 y.o. Sex: female       Cardiovascular Function/Vital Signs  Visit Vitals    /81    Pulse 78    Temp 36.6 °C (97.9 °F)    Resp 16    Ht 5' 9\" (1.753 m)    Wt 102.6 kg (226 lb 4 oz)    SpO2 95%    BMI 33.41 kg/m2       Patient is status post general anesthesia for Procedure(s):   EXAM UNDER ANESTHESIA, DILATION OF HIGH GRADE COMPLETE ANAL CROHN'S  STRICTURE AND FLEXIBLE SIGMOIDOSCOPY WITH BIOPSIES OF RECTUM. Nausea/Vomiting: None    Postoperative hydration reviewed and adequate. Pain:  Pain Scale 1: Numeric (0 - 10) (01/09/18 1410)  Pain Intensity 1: 0 (01/09/18 1410)   Managed    Neurological Status:   Neuro (WDL): Exceptions to WDL (01/09/18 1410)  Neuro  Neurologic State: Alert (01/09/18 1410)  LUE Motor Response: Purposeful (01/09/18 1410)  LLE Motor Response: Purposeful (01/09/18 1410)  RUE Motor Response: Purposeful (01/09/18 1410)  RLE Motor Response: Purposeful (01/09/18 1410)   At baseline    Mental Status and Level of Consciousness: Arousable    Pulmonary Status:   O2 Device: Room air (01/09/18 1258)   Adequate oxygenation and airway patent    Complications related to anesthesia: None    Post-anesthesia assessment completed.  No concerns    Signed By: Mona Brown MD     January 9, 2018
